# Patient Record
(demographics unavailable — no encounter records)

---

## 2024-12-09 NOTE — PLAN
[FreeTextEntry1] : 82 yo M with essential tremor, ascending aorta dilatation, hx of prostate cancer, HLD, prediabetes, presenting for ED follow up from [facility]. Patient seen for bout of food poisoning that has since resolved, but also had a visit regarding single sided neck pain and spasm. Neck discomfort has also completely resolved.  Neck pain resolved, likely MSK in origin GI symptoms resolved Flu vaccine given today All questions answered

## 2024-12-09 NOTE — HISTORY OF PRESENT ILLNESS
[FreeTextEntry1] : f/u [de-identified] : 80 yo M with essential tremor, ascending aorta dilatation, hx of prostate cancer, HLD, prediabetes, presenting for ED follow up from [facility]. Patient seen for bout of food poisoning that has since resolved, but also had a visit regarding single sided neck pain and spasm. Neck discomfort has also completely resolved.   Wife passed away 3 months ago and patient continues to grieve. Lives alone but has family and friends nearby.

## 2025-02-04 NOTE — HISTORY OF PRESENT ILLNESS
[FreeTextEntry1] : APA [de-identified] : 82 yo M with CAD, GERD, essential tremor, prediabetes, HLD, hx of prostate cancer s/p prostatectomy, presenting for asymptomatic hematuria x2 days. Reports painless strands of blood in urine, otherwise no issues. Denies fevers, pain, dysuria, malodor in the urine. Reports hx of kidney stones.

## 2025-02-04 NOTE — PLAN
[FreeTextEntry1] : Obtain urine culture today rule out UTI and quantify hematuria Obtain CT stone hunt study Check labs

## 2025-04-17 NOTE — HEALTH RISK ASSESSMENT
[Yes] : Yes [Monthly or less (1 pt)] : Monthly or less (1 point) [1 or 2 (0 pts)] : 1 or 2 (0 points) [Never (0 pts)] : Never (0 points) [No falls in past year] : Patient reported no falls in the past year [0] : 2) Feeling down, depressed, or hopeless: Not at all (0) [PHQ-2 Negative - No further assessment needed] : PHQ-2 Negative - No further assessment needed [Audit-CScore] : 1 [[id number]] : 0 [Former] : Former [10-14] : 10-14 [> 15 Years] : > 15 Years

## 2025-04-17 NOTE — PLAN
[FreeTextEntry1] : Reviewed ED charts from [facility] with patient All questions answered Exam reassuring  Patient requested referral to spine doctor as previous one has retired; reports he used to get an injection around golf season that would help him; reports he had MR imaging done recently, however none available for view in [facility].  Referral to ortho spine team at [facility].

## 2025-04-17 NOTE — HISTORY OF PRESENT ILLNESS
[FreeTextEntry1] : f/u [de-identified] : 82 yo M with CAD s/p PCI, hx GI bleed due to gastric ulcer, prediabetes, previous hx of paroxysysmal atrial fibrillation, HLD, thoracic aortic aneurysm, and hx of prostate cancer s/p prostatectomy, presenting for ED follow up; seen for visit for gastritis and black stools. In ED, stools noted guaic negative, labs within normal limits. Determined likely cause of dark stools was patient's use of peptobismol. He feels well today, no acute complaints. Eating well and bowel movements are good.

## 2025-04-23 NOTE — DISCUSSION/SUMMARY
[de-identified] : Chief complaint  Back pain  HPI Patient is a very pleasant 81-year-old here for 20 years duration of low back pain.  Patient has right-sided low back pain.  It does not radiate down the legs.  He said multiple rounds of physical therapy and recently's been getting trigger point injections.  He has some pain that goes into the hip.  No fevers or chills.  No issues with gait or balance bowel bladder incontinence.  Pain score is 4 out of 10.  It can get up to 10 out of 10 after he plays golf   patient is NAD, AAOX3 skin is intact, NTTP in LS SPINE mild pain with ROM 5/5 motor strength in BLE SILT L1-S1 BLE POSITIVE straight leg raise Negative farideh equal patella/achilles reflex normal gait  Imaging Review of x-rays demonstrate L4-5 mild loss of disc and facet arthropathy  Treatment note I discussed at length with the patient into the condition.  The patient presents with back pain right-sided hip pain in the setting of L4-5 DDD.  Will get an MRI to better evaluate the pathology.  See the patient after the MRI is completed.  All the patient's questions were sought and answered.  I spent 45 minutes reviewing previous medical records imaging and treatment plan.

## 2025-05-08 NOTE — HISTORY OF PRESENT ILLNESS
[de-identified] : Patient is a very pleasant 82 y/o M who presents to office for MRI review; he reports that pain is well controlled at this time. He leads a very active lifestyle; pain only occurs after golfing and does not radiate down his legs. Patient reports previous trigger point injections have given him months of relief and he takes tylenol as needed for pain. He denies weakness, paresthesia, or bowel/urinary incontinence.

## 2025-05-08 NOTE — DISCUSSION/SUMMARY
[de-identified] : Patient presents for review of MRI today; pain has improved since last visit and he does not have any leg symptoms. He can start physical therapy and take pain medications as needed. Pain management referral given for follow up. All patient questions answered and he agrees with plan.

## 2025-05-08 NOTE — PHYSICAL EXAM
[de-identified] : Patient is alert and oriented x 3. Not in acute distress. Normal gait without use of assistive devices. 5/5 strength too bilateral lower extremities; sensation to light touch intact from L1-S1. [de-identified] : MRI imaging of lumbar spine from [facility] demonstrate multilevel spondylosis worst at L4-L5 level